# Patient Record
Sex: FEMALE | Race: WHITE | NOT HISPANIC OR LATINO | Employment: FULL TIME | URBAN - NONMETROPOLITAN AREA
[De-identification: names, ages, dates, MRNs, and addresses within clinical notes are randomized per-mention and may not be internally consistent; named-entity substitution may affect disease eponyms.]

---
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
## 2024-08-09 ENCOUNTER — OFFICE VISIT (OUTPATIENT)
Dept: INTERNAL MEDICINE | Facility: CLINIC | Age: 24
End: 2024-08-09
Payer: COMMERCIAL

## 2024-08-09 VITALS
SYSTOLIC BLOOD PRESSURE: 116 MMHG | DIASTOLIC BLOOD PRESSURE: 74 MMHG | WEIGHT: 158 LBS | OXYGEN SATURATION: 99 % | HEIGHT: 66 IN | RESPIRATION RATE: 16 BRPM | BODY MASS INDEX: 25.39 KG/M2 | HEART RATE: 62 BPM | TEMPERATURE: 97.2 F

## 2024-08-09 DIAGNOSIS — Z30.011 INITIATION OF OCP (BCP): ICD-10-CM

## 2024-08-09 DIAGNOSIS — G56.01 CARPAL TUNNEL SYNDROME OF RIGHT WRIST: ICD-10-CM

## 2024-08-09 DIAGNOSIS — L70.0 ACNE VULGARIS: ICD-10-CM

## 2024-08-09 DIAGNOSIS — Z76.89 ENCOUNTER TO ESTABLISH CARE: Primary | ICD-10-CM

## 2024-08-09 PROCEDURE — 99204 OFFICE O/P NEW MOD 45 MIN: CPT

## 2024-08-09 RX ORDER — DROSPIRENONE AND ETHINYL ESTRADIOL 0.02-3(28)
1 KIT ORAL DAILY
Qty: 28 TABLET | Refills: 12 | Status: SHIPPED | OUTPATIENT
Start: 2024-08-09

## 2024-08-09 NOTE — PROGRESS NOTES
Female Physical Note      Date: 2024   Patient Name: Jasmin Ellison  : 2000   MRN: [id number]     Chief Complaint:    Chief Complaint   Patient presents with    [facility] Care    Annual Exam       History of Present Illness: Jasmin Ellison is a 23 y.o. female who is here today for their annual health maintenance and physical. ***      Subjective      Review of Systems:   Review of Systems    Past Medical History, Social History, Family History and Care Team were all reviewed with patient and updated as appropriate.     Medications:   No current outpatient medications on file.    Allergies:   Allergies   Allergen Reactions    Aspirin Rash     Facial rash       Immunizations:  Health Maintenance Summary            Overdue - HPV VACCINES (1 - 3-dose series) Never done      No completion, postpone, or frequency change history exists for this topic.              Overdue - TDAP/TD VACCINES (1 - Tdap) Never done      No completion, postpone, or frequency change history exists for this topic.              Overdue - HEPATITIS C SCREENING (Once) Never done      No completion, postpone, or frequency change history exists for this topic.              Overdue - ANNUAL PHYSICAL (Yearly) Never done      No completion, postpone, or frequency change history exists for this topic.              Overdue - PAP SMEAR (Every 3 Years) Never done      No completion, postpone, or frequency change history exists for this topic.              INFLUENZA VACCINE (Yearly - August to March) Due since 2024      10/06/2023  Outside Immunization: Influenza Quad Inj    2017  Outside Immunization: Influenza Quad Inj              BMI FOLLOWUP (Yearly) Next due on 2024  SmartData: BMI EDUCATION FOR OVERWEIGHT              COVID-19 Vaccine (Series Information) Completed      10/06/2023  Outside Immunization: COVID-19 (PFR) 12+yrs    2021  Imm Admin: COVID-19 (PFIZER) Purple Cap Monovalent     "03/26/2021  Imm Admin: COVID-19 (PFIZER) Purple Cap Monovalent              Pneumococcal Vaccine 0-64 (Series Information) Aged Out      No completion, postpone, or frequency change history exists for this topic.                     No orders of the defined types were placed in this encounter.       Colorectal Screening:   ***   Last Completed Colonoscopy       This patient has no relevant Health Maintenance data.          Pap:  ***   Last Completed Pap Smear       This patient has no relevant Health Maintenance data.           Mammogram:  ***   Last Completed Mammogram       This patient has no relevant Health Maintenance data.             CT for Smoker (Age 50-80, 20 pk yr):   ***  Bone Density/DEXA (Age 65 or high risk): {dexa:474640} ***  Hep C (Age 18-79 once): ***  HIV (Age 15-65 once): No results found for: \"HIV1X2\"  A1c: No results found for: \"HGBA1C\"   Lipid panel:  No results found for: \"LIPIDEXCLUSI\"    The ASCVD Risk score (Tavo ORTEGA, et al., 2019) failed to calculate for the following reasons:    The 2019 ASCVD risk score is only valid for ages 40 to 79    Dermatology: ***  Ophthalmologist: ***  Dentist: ***    Tobacco Use: Low Risk  (8/9/2024)    Patient History     Smoking Tobacco Use: Never     Smokeless Tobacco Use: Never     Passive Exposure: Not on file       Social History     Substance and Sexual Activity   Alcohol Use Yes    Alcohol/week: 1.0 standard drink of alcohol    Types: 1 Drinks containing 0.5 oz of alcohol per week    Comment: I drink maybe twice a month        Social History     Substance and Sexual Activity   Drug Use Never        Diet/Physical activity:***    Sexual Health: *** contraception, *** attempting pregnancy   Menopause: ***  Menstrual Cycles: ***, last menstrual cycle: ***    Depression: PHQ-2 Depression Screening  PHQ-9 Total Score: 0     Measures:   Advanced Care Planning:   {Advance Directive Status:77534}    Smoking Cessation:   {time:35651}    Objective     Physical " "Exam:  Vital Signs:   Vitals:    08/09/24 1441   BP: 116/74   Pulse: 62   Resp: 16   Temp: 97.2 °F (36.2 °C)   TempSrc: Temporal   SpO2: 99%   Weight: 71.7 kg (158 lb)   Height: 168 cm (66.14\")   PainSc: 0-No pain     Facility age limit for growth %torrey is 20 years.  Body mass index is 25.39 kg/m².     Physical Exam    POCT Results (if applicable);   Results for orders placed or performed during the hospital encounter of 01/19/23   Covid-19 + Flu A&B AG, Veritor [id number]    Specimen: Swab   Result Value Ref Range    COVID19 Detected (A)     Influenza A Antigen LAURA Not Detected     Influenza B Antigen LAURA Not Detected     Internal Control Passed     Lot Number [id number]     Expiration Date 11/9/23    POC Rapid Strep A    Specimen: Swab   Result Value Ref Range    Rapid Strep A Screen Negative     Internal Control Passed     Lot Number [id number]     Expiration Date 3/31/24         Procedures    Assessment / Plan      Assessment/Plan:   There are no diagnoses linked to this encounter.     Healthcare Maintenance:  Counseling provided based on age appropriate USPSTF guidelines.  {BMI is >= 25 and <30. (Overweight) The following options were offered after discussion;:0738457027}    Jasmin Ellison voices understanding and acceptance of this advice and will call back with any further questions or concerns. AVS with preventive healthcare tips printed for patient.     Vaccine Counseling:  {RBWIMMCOUNSEL (Optional):29617}    Follow Up:   No follow-ups on file.    I have spent a total of *** min on reviewing test results/preparing to see patient, counseling patient, performing medically appropriate exam and documenting clinical information in the electronic or other health record.     Angela Gibbons PA-C  [facility] [place]   "

## 2024-08-09 NOTE — PROGRESS NOTES
New Patient Office Visit      Date: 2024  Patient Name: Jasmin Ellison  : 2000   MRN: [id number]     Chief Complaint:    Chief Complaint   Patient presents with    Establish Care    Acne    Contraception    right wrist pain       History of Present Illness: Jasmin Ellison is a 23 y.o. female who is here today to establish care and discuss multiple concerns today. Patient has not been under the care of a primary care provider in some time.  Patient reports no significant past medical or surgical history.  She is currently on no medications currently.  She was previously on an oral contraceptive pill from age [age] due to heavy menstruations.  She ultimately decided to discontinue this but over the past 6 months or so she has developed an increase in acne.  She has been following a strict facial care routine but has not noticed any improvement.  She has not using a face wash that contains benzyl peroxide currently.  She is open to reinitiating OCP therapy.  No prior Pap smear performed.    Patient is also complaining of right wrist pain.  She was previously diagnosed with carpal tunnel syndrome and trialed 1-2 sessions of physical therapy.  She has not had much relief since.    Patient denies use of tobacco, ecigarettes, illicit drugs, and alcohol.  She is currently in a monogamous relationship using barrier method contraceptive.    Subjective      Review of Systems:   Review of Systems   Constitutional:  Negative for chills and fever.   Respiratory:  Negative for cough and shortness of breath.    Cardiovascular:  Negative for chest pain.   Gastrointestinal:  Negative for abdominal pain.   Genitourinary:  Positive for menstrual problem.   Musculoskeletal:  Positive for arthralgias.   Neurological:  Negative for weakness and numbness.   Psychiatric/Behavioral:  Negative for suicidal ideas and depressed mood. The patient is not nervous/anxious.        Past Medical History:   Past Medical History:  "        Gómez Mancilla   2017 1:00 PM   Office Visit   MRN: [id number]    Department:  [facility]   Dept Phone:  [phone]    Description:  Female : 2005   Provider:  Arron Pedro M.D.           Reason for Visit     Head Ache tracy for years, been getting worse past 2 wks      Allergies as of 2017     No Known Allergies      You were diagnosed with     Acute vaginitis   [330574]       Chronic nonintractable headache, unspecified headache type   [4212382]         Vital Signs     Blood Pressure Pulse Temperature Height Weight Body Mass Index    112/72 mmHg 115 36.6 °C (97.8 °F) 1.626 m (5' 4\") 76.204 kg (168 lb) 28.82 kg/m2    Oxygen Saturation                   96%           Basic Information     Date Of Birth Sex Race Ethnicity Preferred Language    2005 Female White Non- English      Problem List              ICD-10-CM Priority Class Noted - Resolved    Metrorrhagia N92.1   2014 - Present    Subcutaneous cyst L72.9   2014 - Present    Pronation of feet M21.6X9   2014 - Present    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      Below and/or attached are the medications your provider expects you to take. Review all of your home medications and newly ordered medications with your provider and/or pharmacist. Follow medication instructions as directed by your provider and/or pharmacist. " "  Diagnosis Date    Childhood asthma     Ear infection        Past Surgical History:   Past Surgical History:   Procedure Laterality Date    EAR TUBES         Family History:   Family History   Problem Relation Age of Onset    Autoimmune disease Mother     Hepatitis Mother     Anxiety disorder Mother     Hyperlipidemia Father        Social History:   Social History     Socioeconomic History    Marital status: Single   Tobacco Use    Smoking status: Never    Smokeless tobacco: Never   Vaping Use    Vaping status: Never Used   Substance and Sexual Activity    Alcohol use: Yes     Alcohol/week: 1.0 standard drink of alcohol     Types: 1 Drinks containing 0.5 oz of alcohol per week     Comment: I drink maybe twice a month    Drug use: Never    Sexual activity: Yes     Partners: Male     Birth control/protection: Condom, Natural family planning/Rhythm       Medications: No current medications.     Allergies:   Allergies   Allergen Reactions    Aspirin Rash     Facial rash       Objective     Physical Exam:  Vital Signs:   Vitals:    08/09/24 1441   BP: 116/74   Pulse: 62   Resp: 16   Temp: 97.2 °F (36.2 °C)   TempSrc: Temporal   SpO2: 99%   Weight: 71.7 kg (158 lb)   Height: 168 cm (66.14\")   PainSc: 0-No pain     Body mass index is 25.39 kg/m².   Facility age limit for growth %torrey is 20 years.          Physical Exam  Vitals and nursing note reviewed.   Constitutional:       General: She is not in acute distress.     Appearance: Normal appearance.   Eyes:      Extraocular Movements: Extraocular movements intact.      Conjunctiva/sclera: Conjunctivae normal.   Cardiovascular:      Rate and Rhythm: Normal rate and regular rhythm.      Pulses: Normal pulses.      Heart sounds: Normal heart sounds.   Pulmonary:      Effort: Pulmonary effort is normal. No respiratory distress.      Breath sounds: Normal breath sounds.   Musculoskeletal:      Right wrist: No swelling, deformity, bony tenderness or snuff box tenderness. Normal " Please keep your medication list with you and share with your provider. Update the information when medications are discontinued, doses are changed, or new medications (including over-the-counter products) are added; and carry medication information at all times in the event of emergency situations     Allergies:  No Known Allergies          Medications  Valid as of: June 09, 2017 -  3:05 PM    Generic Name Brand Name Tablet Size Instructions for use    Desogestrel-Ethinyl Estradiol   Take  by mouth.        Fluconazole (Tab) DIFLUCAN 150 MG 1 TAB BY MOUTH X 1 DOSE ON 6/9 AND 6/12/17.        .                 Medicines prescribed today were sent to:     [facility] [id number]  [place], [place] - [address] AT [facility] [address]    [address] [place] [address]    Phone: [phone] Fax: [phone]    Open 24 Hours?: No      Medication refill instructions:       If your prescription bottle indicates you have medication refills left, it is not necessary to call your provider’s office. Please contact your pharmacy and they will refill your medication.    If your prescription bottle indicates you do not have any refills left, you may request refills at any time through one of the following ways: The online [employer] system (except Urgent Care), by calling your provider’s office, or by asking your pharmacy to contact your provider’s office with a refill request. Medication refills are processed only during regular business hours and may not be available until the next business day. Your provider may request additional information or to have a follow-up visit with you prior to refilling your medication.   *Please Note: Medication refills are assigned a new Rx number when refilled electronically. Your pharmacy may indicate that no refills were authorized even though a new prescription for the same medication is available at the pharmacy. Please request the medicine by name with the pharmacy before  contacting your provider for a refill.        Your To Do List     Future Labs/Procedures Complete By Expires    MR-BRAIN-WITH  As directed 6/9/2018      Referral     A referral request has been sent to our patient care coordination department. Please allow 3-5 business days for us to process this request and contact you either by phone or mail. If you do not hear from us by the 5th business day, please call us at [phone].           range of motion. Normal pulse.   Neurological:      General: No focal deficit present.      Mental Status: She is alert and oriented to person, place, and time. Mental status is at baseline.   Psychiatric:         Mood and Affect: Mood normal.         Behavior: Behavior normal.               Assessment / Plan      Assessment/Plan:   Diagnoses and all orders for this visit:    1. Encounter to establish care (Primary)  Assessment & Plan:  - Discussed need for vax records  - Will update annual with PAP at next visit      2. Initiation of OCP (BCP)  Assessment & Plan:  - Long discussion regarding contraception methods, ultimately patient would like to trial OCP with hopes it improves acne.   - Had previous success on Lluvia; side effect profile discussed    Orders:  -     drospirenone-ethinyl estradiol (Lluvia) 3-0.02 MG per tablet; Take 1 tablet by mouth Daily.  Dispense: 28 tablet; Refill: 12    3. Carpal tunnel syndrome of right wrist  -     Ambulatory Referral to Occupational Therapy    4. Acne vulgaris  Assessment & Plan:  - Instructed pt to try benzyl peroxide once daily  - Hopeful OCP will improve acne         Follow Up:   Return in about 1 month (around 9/9/2024) for Annual with PAP.      [facility] [place]

## 2024-08-12 PROBLEM — Z76.89 ENCOUNTER TO ESTABLISH CARE: Status: ACTIVE | Noted: 2024-08-12

## 2024-08-12 PROBLEM — Z30.011 INITIATION OF OCP (BCP): Status: ACTIVE | Noted: 2024-08-12

## 2024-08-12 PROBLEM — L70.0 ACNE VULGARIS: Status: ACTIVE | Noted: 2024-08-12

## 2024-08-12 NOTE — ASSESSMENT & PLAN NOTE
- Long discussion regarding contraception methods, ultimately patient would like to trial OCP with hopes it improves acne.   - Had previous success on Lluvia; side effect profile discussed

## 2024-08-16 ENCOUNTER — PATIENT ROUNDING (BHMG ONLY) (OUTPATIENT)
Dept: INTERNAL MEDICINE | Facility: CLINIC | Age: 24
End: 2024-08-16
Payer: COMMERCIAL

## 2024-08-16 NOTE — PROGRESS NOTES
A My-chart message has been sent to the patient for Patient Rounding with [facility].

## 2024-09-12 ENCOUNTER — OFFICE VISIT (OUTPATIENT)
Dept: INTERNAL MEDICINE | Facility: CLINIC | Age: 24
End: 2024-09-12
Payer: COMMERCIAL

## 2024-09-12 ENCOUNTER — LAB (OUTPATIENT)
Dept: LAB | Facility: HOSPITAL | Age: 24
End: 2024-09-12
Payer: COMMERCIAL

## 2024-09-12 VITALS
OXYGEN SATURATION: 98 % | WEIGHT: 154.2 LBS | BODY MASS INDEX: 24.78 KG/M2 | HEIGHT: 66 IN | DIASTOLIC BLOOD PRESSURE: 62 MMHG | HEART RATE: 54 BPM | TEMPERATURE: 97.1 F | RESPIRATION RATE: 16 BRPM | SYSTOLIC BLOOD PRESSURE: 112 MMHG

## 2024-09-12 DIAGNOSIS — Z23 NEED FOR VACCINATION: ICD-10-CM

## 2024-09-12 DIAGNOSIS — Z00.00 ANNUAL PHYSICAL EXAM: Primary | ICD-10-CM

## 2024-09-12 DIAGNOSIS — L70.0 ACNE VULGARIS: ICD-10-CM

## 2024-09-12 DIAGNOSIS — Z01.419 ENCOUNTER FOR ROUTINE GYNECOLOGICAL EXAMINATION WITH PAPANICOLAOU SMEAR OF CERVIX: ICD-10-CM

## 2024-09-12 DIAGNOSIS — Z00.00 ANNUAL PHYSICAL EXAM: ICD-10-CM

## 2024-09-12 LAB
ALBUMIN SERPL-MCNC: 4.3 G/DL (ref 3.5–5.2)
ALBUMIN/GLOB SERPL: 1.5 G/DL
ALP SERPL-CCNC: 45 U/L (ref 39–117)
ALT SERPL W P-5'-P-CCNC: 25 U/L (ref 1–33)
ANION GAP SERPL CALCULATED.3IONS-SCNC: 9.9 MMOL/L (ref 5–15)
AST SERPL-CCNC: 22 U/L (ref 1–32)
BASOPHILS # BLD AUTO: 0.03 10*3/MM3 (ref 0–0.2)
BASOPHILS NFR BLD AUTO: 0.4 % (ref 0–1.5)
BILIRUB SERPL-MCNC: 0.4 MG/DL (ref 0–1.2)
BUN SERPL-MCNC: 10 MG/DL (ref 6–20)
BUN/CREAT SERPL: 11.9 (ref 7–25)
CALCIUM SPEC-SCNC: 9.6 MG/DL (ref 8.6–10.5)
CHLORIDE SERPL-SCNC: 104 MMOL/L (ref 98–107)
CO2 SERPL-SCNC: 26.1 MMOL/L (ref 22–29)
CREAT SERPL-MCNC: 0.84 MG/DL (ref 0.57–1)
DEPRECATED RDW RBC AUTO: 39.2 FL (ref 37–54)
EGFRCR SERPLBLD CKD-EPI 2021: 99.7 ML/MIN/1.73
EOSINOPHIL # BLD AUTO: 0.06 10*3/MM3 (ref 0–0.4)
EOSINOPHIL NFR BLD AUTO: 0.8 % (ref 0.3–6.2)
ERYTHROCYTE [DISTWIDTH] IN BLOOD BY AUTOMATED COUNT: 11.8 % (ref 12.3–15.4)
GLOBULIN UR ELPH-MCNC: 2.9 GM/DL
GLUCOSE SERPL-MCNC: 73 MG/DL (ref 65–99)
HCT VFR BLD AUTO: 44.8 % (ref 34–46.6)
HGB BLD-MCNC: 14.9 G/DL (ref 12–15.9)
IMM GRANULOCYTES # BLD AUTO: 0.01 10*3/MM3 (ref 0–0.05)
IMM GRANULOCYTES NFR BLD AUTO: 0.1 % (ref 0–0.5)
LYMPHOCYTES # BLD AUTO: 2.14 10*3/MM3 (ref 0.7–3.1)
LYMPHOCYTES NFR BLD AUTO: 28.8 % (ref 19.6–45.3)
MCH RBC QN AUTO: 30.5 PG (ref 26.6–33)
MCHC RBC AUTO-ENTMCNC: 33.3 G/DL (ref 31.5–35.7)
MCV RBC AUTO: 91.8 FL (ref 79–97)
MONOCYTES # BLD AUTO: 0.62 10*3/MM3 (ref 0.1–0.9)
MONOCYTES NFR BLD AUTO: 8.3 % (ref 5–12)
NEUTROPHILS NFR BLD AUTO: 4.58 10*3/MM3 (ref 1.7–7)
NEUTROPHILS NFR BLD AUTO: 61.6 % (ref 42.7–76)
NRBC BLD AUTO-RTO: 0 /100 WBC (ref 0–0.2)
PLATELET # BLD AUTO: 231 10*3/MM3 (ref 140–450)
PMV BLD AUTO: 12.1 FL (ref 6–12)
POTASSIUM SERPL-SCNC: 4.3 MMOL/L (ref 3.5–5.2)
PROT SERPL-MCNC: 7.2 G/DL (ref 6–8.5)
RBC # BLD AUTO: 4.88 10*6/MM3 (ref 3.77–5.28)
SODIUM SERPL-SCNC: 140 MMOL/L (ref 136–145)
TSH SERPL DL<=0.05 MIU/L-ACNC: 1.42 UIU/ML (ref 0.27–4.2)
WBC NRBC COR # BLD AUTO: 7.44 10*3/MM3 (ref 3.4–10.8)

## 2024-09-12 PROCEDURE — 90651 9VHPV VACCINE 2/3 DOSE IM: CPT

## 2024-09-12 PROCEDURE — 90471 IMMUNIZATION ADMIN: CPT

## 2024-09-12 PROCEDURE — 99395 PREV VISIT EST AGE 18-39: CPT

## 2024-09-12 PROCEDURE — 80050 GENERAL HEALTH PANEL: CPT

## 2024-09-12 NOTE — PROGRESS NOTES
Female Physical Note      Date: 2024   Patient Name: Jasmin Ellison  : 2000   MRN: [id number]     Chief Complaint:    Chief Complaint   Patient presents with    Annual Exam    Gynecologic Exam     Not interested in Flu or Covid but possibly HPV vaccine          History of Present Illness: Jasmin Ellison is a 23 y.o. female who is here today for their annual health maintenance and physical and to update Pap smear. Overall patient reports she has been doing well. During last visit patient was re-initiated on OCP to help with acne. Patient reports sx are improving. She has also started using benzyl peroxide.  Patient reports no adverse effects since initiating OCP.  Patient was also complaining of carpal tunnel.  Patient reports she did purchase a new mouse to see if this helps alleviate symptoms.      She has remained active with walking, training at the gym and with [employer].  She is working out approximately 30 minutes, 6 days a week.  Reports an overall balanced diet.  Is compliant with ophthalmology and dental evaluations.  Pap screen updated today.  Compliant with self breast examinations.  She is interested in HPV vaccine however declines COVID and flu today.  No other complaints today.    Subjective      Review of Systems:   Review of Systems   Constitutional:  Negative for chills and fever.   HENT:  Negative for congestion and rhinorrhea.    Respiratory:  Negative for shortness of breath.    Cardiovascular:  Negative for chest pain.   Gastrointestinal:  Negative for abdominal pain, constipation and diarrhea.   Genitourinary:  Negative for dysuria.   Musculoskeletal:  Negative for myalgias.   Skin:  Negative for skin lesions.   Allergic/Immunologic: Negative for environmental allergies.   Neurological:  Negative for dizziness and headache.   Psychiatric/Behavioral:  Negative for suicidal ideas.        Past Medical History, Social History, Family History and Care Team were all reviewed with  patient and updated as appropriate.     Medications:     Current Outpatient Medications:     drospirenone-ethinyl estradiol (Lluvia) 3-0.02 MG per tablet, Take 1 tablet by mouth Daily., Disp: 28 tablet, Rfl: 12    Allergies:   Allergies   Allergen Reactions    Aspirin Rash     Facial rash       Immunizations:  Health Maintenance Summary            Overdue - TDAP/TD VACCINES (1 - Tdap) Never done      No completion, postpone, or frequency change history exists for this topic.              Overdue - HEPATITIS C SCREENING (Once) Never done      No completion, postpone, or frequency change history exists for this topic.              Ordered - CHLAMYDIA SCREENING (Yearly) Ordered on 9/12/2024      No completion, postpone, or frequency change history exists for this topic.              HPV VACCINES (2 - 3-dose series) Due soon on 10/10/2024      09/12/2024  Imm Admin: Hpv9              Postponed - INFLUENZA VACCINE (Yearly - August to March) Postponed until 3/31/2025      09/12/2024  Postponed until 3/31/2025 by Criss Larry (Patient Refused)    10/06/2023  Outside Immunization: Influenza Quad Inj    08/29/2017  Outside Immunization: Influenza Quad Inj              ANNUAL PHYSICAL (Yearly) Next due on 9/12/2025 09/12/2024  Done    08/09/2024  Done              PAP SMEAR (Every 3 Years) Next due on 9/12/2027 09/12/2024  Done              COVID-19 Vaccine (Series Information) Completed      10/06/2023  Outside Immunization: COVID-19 (PFR) 12+yrs    04/19/2021  Imm Admin: COVID-19 (PFIZER) Purple Cap Monovalent    03/26/2021  Imm Admin: COVID-19 (PFIZER) Purple Cap Monovalent              Pneumococcal Vaccine 0-64 (Series Information) Aged Out      No completion, postpone, or frequency change history exists for this topic.                     Orders Placed This Encounter   Procedures    HPV Vaccine        Pap:  updated today   Last Completed Pap Smear            PAP SMEAR (Every 3 Years) Next due on  "9/12/2027 09/12/2024  Done                   Dermatology: none currently  Ophthalmologist: compliant with routine screenings  Dentist: dental screenings every 6-months; [facility]    Tobacco Use: Low Risk  (9/12/2024)    Patient History     Smoking Tobacco Use: Never     Smokeless Tobacco Use: Never     Passive Exposure: Not on file       Social History     Substance and Sexual Activity   Alcohol Use Yes    Alcohol/week: 1.0 standard drink of alcohol    Types: 1 Drinks containing 0.5 oz of alcohol per week    Comment: I drink maybe twice a month        Social History     Substance and Sexual Activity   Drug Use Never        Diet/Physical activity: walking 2-3 miles most days, F45, gym, working out 30 minutes a day approx 6-days a week; overall balanced diet    Sexual Health: OCP contraception, not attempting pregnancy   Menstrual Cycles: regular, last menstrual cycle: 08/10/2024    Depression: PHQ-2 Depression Screening  PHQ-9 Total Score: 0     Objective     Physical Exam:  Vital Signs:   Vitals:    09/12/24 0839   BP: 112/62   Pulse: 54   Resp: 16   Temp: 97.1 °F (36.2 °C)   TempSrc: Temporal   SpO2: 98%   Weight: 69.9 kg (154 lb 3.2 oz)   Height: 168 cm (66.14\")   PainSc: 0-No pain     Facility age limit for growth %torrey is 20 years.  Body mass index is 24.78 kg/m².     Physical Exam  Vitals and nursing note reviewed. Exam conducted with a chaperone present.   Constitutional:       General: She is not in acute distress.     Appearance: Normal appearance.   HENT:      Head: Normocephalic and atraumatic.      Right Ear: Tympanic membrane, ear canal and external ear normal.      Left Ear: Tympanic membrane, ear canal and external ear normal.      Mouth/Throat:      Pharynx: Oropharynx is clear.   Eyes:      Conjunctiva/sclera: Conjunctivae normal.      Pupils: Pupils are equal, round, and reactive to light.   Neck:      Thyroid: No thyroid mass, thyromegaly or thyroid tenderness. "   Cardiovascular:      Rate and Rhythm: Normal rate and regular rhythm.      Pulses: Normal pulses.      Heart sounds: Normal heart sounds. No murmur heard.  Pulmonary:      Effort: Pulmonary effort is normal. No respiratory distress.      Breath sounds: Normal breath sounds. No wheezing.   Abdominal:      General: Bowel sounds are normal.      Palpations: Abdomen is soft.      Tenderness: There is no abdominal tenderness.      Hernia: There is no hernia in the left inguinal area or right inguinal area.   Genitourinary:     Exam position: Lithotomy position.      Labia:         Right: No rash, tenderness, lesion or injury.         Left: No rash, tenderness, lesion or injury.       Urethra: No prolapse, urethral pain, urethral swelling or urethral lesion.      Vagina: Normal. No signs of injury and foreign body. No vaginal discharge, erythema, tenderness, bleeding, lesions or prolapsed vaginal walls.      Cervix: Discharge (mild white) and cervical bleeding (mild bleeding upon specimen collection) present.      Uterus: Normal.       Adnexa: Right adnexa normal and left adnexa normal.   Musculoskeletal:         General: No swelling. Normal range of motion.      Cervical back: Normal range of motion and neck supple.   Lymphadenopathy:      Lower Body: No right inguinal adenopathy. No left inguinal adenopathy.   Skin:     General: Skin is warm and dry.   Neurological:      General: No focal deficit present.      Mental Status: She is alert and oriented to person, place, and time.   Psychiatric:         Mood and Affect: Mood normal.         Behavior: Behavior normal.         Assessment / Plan      Assessment/Plan:   Diagnoses and all orders for this visit:    1. Annual physical exam (Primary)  Assessment & Plan:  - Counseled patient regarding multimodal approach with healthy nutrition, healthy sleep, routine physical activity, counseling, safety measures and medications.  - PAP updated today    Orders:  -     CBC Auto  Differential; Future  -     Comprehensive Metabolic Panel; Future  -     TSH Rfx On Abnormal To Free T4; Future    2. Acne vulgaris  Assessment & Plan:  - Improving, will continue to monitor      3. Encounter for routine gynecological examination with Papanicolaou smear of cervix  -     LIQUID-BASED PAP SMEAR WITH HPV GENOTYPING REGARDLESS OF INTERPRETATION (NELY,COR,MAD); Future  -     LIQUID-BASED PAP SMEAR WITH HPV GENOTYPING REGARDLESS OF INTERPRETATION (NELY,COR,MAD)    4. Need for vaccination  -     HPV Vaccine         Healthcare Maintenance:  Counseling provided based on age appropriate USPSTF guidelines.  BMI is within normal parameters. No other follow-up for BMI required.    Jasmin Ellison voices understanding and acceptance of this advice and will call back with any further questions or concerns.     Vaccine Counseling:  “Discussed risks/benefits to vaccination, reviewed components of the vaccine, discussed VIS, discussed informed consent, informed consent obtained. Patient/Parent was allowed to accept or refuse vaccine. Questions answered to satisfactory state of patient/Parent. We reviewed typical age appropriate and seasonally appropriate vaccinations. Reviewed immunization history and updated state vaccination form as needed. Patient was counseled on HPV    Follow Up:   Return in about 1 year (around 9/12/2025) for Annual.      Angela Gibbons PA-C  [facility]

## 2024-09-12 NOTE — ASSESSMENT & PLAN NOTE
- Counseled patient regarding multimodal approach with healthy nutrition, healthy sleep, routine physical activity, counseling, safety measures and medications.  - PAP updated today

## 2024-09-26 LAB — REF LAB TEST METHOD: NORMAL
